# Patient Record
Sex: FEMALE | Race: OTHER | URBAN - METROPOLITAN AREA
[De-identification: names, ages, dates, MRNs, and addresses within clinical notes are randomized per-mention and may not be internally consistent; named-entity substitution may affect disease eponyms.]

---

## 2017-03-17 ENCOUNTER — OFFICE VISIT (OUTPATIENT)
Dept: DERMATOLOGY CLINIC | Facility: CLINIC | Age: 27
End: 2017-03-17

## 2017-03-17 DIAGNOSIS — L30.9 DERMATITIS: Primary | ICD-10-CM

## 2017-03-17 DIAGNOSIS — L85.8 KERATOSIS PILARIS: ICD-10-CM

## 2017-03-17 DIAGNOSIS — Q80.9 XERODERMA: ICD-10-CM

## 2017-03-17 PROCEDURE — 99213 OFFICE O/P EST LOW 20 MIN: CPT | Performed by: DERMATOLOGY

## 2017-03-17 PROCEDURE — 99212 OFFICE O/P EST SF 10 MIN: CPT | Performed by: DERMATOLOGY

## 2017-03-17 RX ORDER — KETOCONAZOLE 20 MG/ML
SHAMPOO TOPICAL
Qty: 120 ML | Refills: 3 | Status: SHIPPED | OUTPATIENT
Start: 2017-03-17 | End: 2017-08-02 | Stop reason: ALTCHOICE

## 2017-03-17 RX ORDER — FLUOCINONIDE 0.5 MG/ML
SOLUTION TOPICAL
Qty: 60 ML | Refills: 12 | Status: SHIPPED | OUTPATIENT
Start: 2017-03-17 | End: 2017-08-02 | Stop reason: ALTCHOICE

## 2017-03-17 RX ORDER — DESONIDE 0.5 MG/ML
LOTION TOPICAL
Qty: 120 ML | Refills: 3 | Status: SHIPPED | OUTPATIENT
Start: 2017-03-17 | End: 2017-08-02 | Stop reason: ALTCHOICE

## 2017-04-03 NOTE — PROGRESS NOTES
Naomie Dumont is a 31year old female. Patient presents with:  Eczema: Pt c/o dry, flaky, itchy scalp x a few months. Has tried coconut oil w/o benefit. Pt also asking about topical for lesions at arms, worse in summer.   Tried Lac-Hydrin w/o sig rosa maria Reaction to local anesthetic No     Social History Narrative     No family history on file. HPI :      Patient presents with:  Eczema: Pt c/o dry, flaky, itchy scalp x a few months. Has tried coconut oil w/o benefit.   Pt also asking abou irritants reviewed as well. Pathophysiology reviewed. Consider Contact allergy in differential.  Consider patch testing. Patient will let us know how they are doing over the next several weeks. Await clinical response to above therapy.     Caution again

## 2017-08-02 ENCOUNTER — OFFICE VISIT (OUTPATIENT)
Dept: FAMILY MEDICINE CLINIC | Facility: CLINIC | Age: 27
End: 2017-08-02

## 2017-08-02 VITALS
SYSTOLIC BLOOD PRESSURE: 102 MMHG | RESPIRATION RATE: 16 BRPM | WEIGHT: 243 LBS | HEIGHT: 66 IN | BODY MASS INDEX: 39.05 KG/M2 | OXYGEN SATURATION: 97 % | DIASTOLIC BLOOD PRESSURE: 70 MMHG | TEMPERATURE: 98 F | HEART RATE: 74 BPM

## 2017-08-02 DIAGNOSIS — J02.9 PHARYNGITIS, UNSPECIFIED ETIOLOGY: ICD-10-CM

## 2017-08-02 DIAGNOSIS — L50.9 HIVES OF UNKNOWN ORIGIN: Primary | ICD-10-CM

## 2017-08-02 LAB — CONTROL LINE PRESENT WITH A CLEAR BACKGROUND (YES/NO): YES YES/NO

## 2017-08-02 PROCEDURE — 99202 OFFICE O/P NEW SF 15 MIN: CPT | Performed by: NURSE PRACTITIONER

## 2017-08-02 PROCEDURE — 87880 STREP A ASSAY W/OPTIC: CPT | Performed by: NURSE PRACTITIONER

## 2017-08-02 PROCEDURE — 87081 CULTURE SCREEN ONLY: CPT | Performed by: NURSE PRACTITIONER

## 2017-08-02 RX ORDER — LEVOCETIRIZINE DIHYDROCHLORIDE 5 MG/1
5 TABLET, FILM COATED ORAL EVERY EVENING
Qty: 30 TABLET | Refills: 0 | Status: SHIPPED | OUTPATIENT
Start: 2017-08-02 | End: 2017-09-01

## 2017-08-02 RX ORDER — PREDNISONE 10 MG/1
TABLET ORAL
COMMUNITY
Start: 2017-07-29

## 2017-08-02 NOTE — PATIENT INSTRUCTIONS
Follow up with PCP for ongoing hives. Make an appointment for allergist as discussed with PCP to determine cause of hives. We will send out a throat culture and will contact you with the results in 48-72 hours.  If positive, then we will call in an ap hives.  · Make a thick paste of baking soda and water. Apply the paste directly to your skin. This can help lessen itching. · Talk with your healthcare provider right away if you think a medicine gave you hives.   Watch for anaphalaxis  If you have hives, caused by a fungus (ringworm), virus (chickenpox), or bacteria (strep)  · Bites or infestation caused by insects or pests, such as ticks, lice, or mites  · Dry skin, which is often seen during the winter months and in older people  How can I control itchin throat, a cough, or unusual fatigue  · Red, oozy, or painful rash gets worse. These are signs of infection.   · Rash covers your face, genitals, or most of your body  · Crusty sores or red rings that begin to spread  · You were exposed to someone who has a or if you are already taking blood thinners.   · For sore throats caused by allergies, try antihistamines to block the allergic reaction. · Remember: unless a sore throat is caused by a bacterial infection, antibiotics won’t help you.   Prevent future sore

## 2017-08-02 NOTE — PROGRESS NOTES
HPI:     Patient presents with:  Hives: on tapering prednisone      HPI:  This is hives problem. The current episode started in the past 5 days. The problem has been waxing and waning since onset.  The affected locations include arms and legs some on thorax HEENT: congested due to allergies. See hpi. No snoring, no hyposmia, no purulent rhinorrhea. No edema of the lips or swelling of throat. CHEST: no chest pains. LUNGS: denies shortness of breath with exertion or rest.No wheezing, no cough.   CARDIOVASCUL Risks, benefits, side effects of medication explained and discussed. Rapid strep was negative. Will send culture. The patient is asked to follow up with PCP in 2-3 days if sx's persist or sooner if sx's worsen.   The patient indicates understanding · Exercise  · Scratching or rubbing your skin, or wearing tight-fitting clothes that rub your skin  · Being exposed to sunlight or light from a light bulb, in rare cases  In some cases, hives may occur again and again with no specific cause.   If you have h When your skin reacts to a substance your body is sensitive to, it can cause a rash. You can treat most rashes at home by keeping the skin clean and dry. Many rashes may get better on their own within 2 to 3 days.  You may need medical attention if your bunny · Most over-the-counter antifungal medicines can treat athlete’s foot and many other fungal infections of the skin.   Check with your healthcare provider  Call your healthcare provider if:  · You were told that you have a fungal infection on your skin to ma · Suck on throat lozenges, cough drops, hard candy, ice chips, or frozen fruit-juice bars. Use the sugar-free versions if your diet or medical condition requires them. Gargle to ease irritation  Gargling every hour or 2 can ease irritation.  Try gargling w

## 2018-10-12 ENCOUNTER — OFFICE VISIT (OUTPATIENT)
Dept: OTOLARYNGOLOGY | Facility: CLINIC | Age: 28
End: 2018-10-12
Payer: COMMERCIAL

## 2018-10-12 VITALS
HEIGHT: 66 IN | TEMPERATURE: 98 F | BODY MASS INDEX: 36.96 KG/M2 | WEIGHT: 230 LBS | SYSTOLIC BLOOD PRESSURE: 102 MMHG | DIASTOLIC BLOOD PRESSURE: 70 MMHG

## 2018-10-12 DIAGNOSIS — H60.392 OTHER INFECTIVE CHRONIC OTITIS EXTERNA OF LEFT EAR: Primary | ICD-10-CM

## 2018-10-12 PROCEDURE — 99203 OFFICE O/P NEW LOW 30 MIN: CPT | Performed by: OTOLARYNGOLOGY

## 2018-10-12 PROCEDURE — 99212 OFFICE O/P EST SF 10 MIN: CPT | Performed by: OTOLARYNGOLOGY

## 2018-10-12 NOTE — PROGRESS NOTES
Naomie Dumont is a 33year old female. Patient presents with:  Ear Problem: recurrent ear infections    HPI:   For the last several months she has been experiencing recurrent problems with infections in her left or right ear.   She is having more difficulty situation. Appropriate mood and affect. Lymph Detail Normal Submental. Submandibular. Anterior cervical. Posterior cervical. Supraclavicular.    Eyes Normal Conjunctiva - Right: Normal, Left: Normal. Pupil - Right: Normal, Left: Normal.    Ears Normal Ins

## 2020-08-15 ENCOUNTER — OFFICE VISIT (OUTPATIENT)
Dept: OTOLARYNGOLOGY | Facility: CLINIC | Age: 30
End: 2020-08-15
Payer: COMMERCIAL

## 2020-08-15 VITALS — SYSTOLIC BLOOD PRESSURE: 100 MMHG | DIASTOLIC BLOOD PRESSURE: 78 MMHG | TEMPERATURE: 98 F

## 2020-08-15 DIAGNOSIS — H92.02 LEFT EAR PAIN: Primary | ICD-10-CM

## 2020-08-15 PROCEDURE — 3074F SYST BP LT 130 MM HG: CPT | Performed by: OTOLARYNGOLOGY

## 2020-08-15 PROCEDURE — 3078F DIAST BP <80 MM HG: CPT | Performed by: OTOLARYNGOLOGY

## 2020-08-15 PROCEDURE — 99213 OFFICE O/P EST LOW 20 MIN: CPT | Performed by: OTOLARYNGOLOGY

## 2020-08-15 NOTE — PROGRESS NOTES
Hansa Butler is a 31year old female. Patient presents with:  Ear Problem: pt presents with right ear pain for 1 week, per pt recurring infections in both ears     HPI:   She has been experiencing intermittent pain in her left ear over the last few weeks. Normal Submental. Submandibular. Anterior cervical. Posterior cervical. Supraclavicular.    Eyes Normal Conjunctiva - Right: Normal, Left: Normal. Pupil - Right: Normal, Left: Normal.    Ears Normal Inspection - Right: Normal, Left: Normal. Canal - Left: No

## (undated) NOTE — MR AVS SNAPSHOT
[facility] SPECIALTY [facility] - [place] [address] [id number] [phone]               Thank you for choosing us for your health care visit with Marycarmen Ochoa MD.  We are glad to serve you and happy to provide you with this summary of If you have questions, you can call [phone] to talk to our [facility] Staff. Remember, [employer] is NOT to be used for urgent needs. For medical emergencies, dial 911. Visit [email or web address] to learn more.         Educational Infor